# Patient Record
Sex: FEMALE | Race: WHITE | NOT HISPANIC OR LATINO | Employment: OTHER | URBAN - METROPOLITAN AREA
[De-identification: names, ages, dates, MRNs, and addresses within clinical notes are randomized per-mention and may not be internally consistent; named-entity substitution may affect disease eponyms.]

---

## 2019-08-11 PROBLEM — E07.9 THYROID EYE DISEASE: Status: ACTIVE | Noted: 2019-08-11

## 2019-08-11 PROBLEM — H25.811 COMBINED FORMS OF AGE-RELATED CATARACT OF RIGHT EYE: Status: ACTIVE | Noted: 2019-08-11

## 2019-08-11 PROBLEM — H57.89 THYROID EYE DISEASE: Status: ACTIVE | Noted: 2019-08-11

## 2019-12-12 PROBLEM — H26.8 MATURE CATARACT: Status: ACTIVE | Noted: 2019-12-12

## 2021-04-23 ENCOUNTER — PATIENT OUTREACH (OUTPATIENT)
Dept: ADMINISTRATIVE | Facility: HOSPITAL | Age: 61
End: 2021-04-23

## 2021-06-09 ENCOUNTER — PATIENT OUTREACH (OUTPATIENT)
Dept: ADMINISTRATIVE | Facility: HOSPITAL | Age: 61
End: 2021-06-09

## 2021-06-09 DIAGNOSIS — Z12.31 SCREENING MAMMOGRAM, ENCOUNTER FOR: Primary | ICD-10-CM

## 2021-07-12 ENCOUNTER — NURSE TRIAGE (OUTPATIENT)
Dept: ADMINISTRATIVE | Facility: CLINIC | Age: 61
End: 2021-07-12

## 2021-08-02 PROBLEM — E03.8 OTHER SPECIFIED HYPOTHYROIDISM: Status: ACTIVE | Noted: 2021-08-02

## 2021-08-02 PROBLEM — E11.628 TYPE 2 DIABETES MELLITUS WITH SKIN COMPLICATION, WITH LONG-TERM CURRENT USE OF INSULIN: Status: ACTIVE | Noted: 2021-08-02

## 2021-08-02 PROBLEM — Z79.4 TYPE 2 DIABETES MELLITUS WITH SKIN COMPLICATION, WITH LONG-TERM CURRENT USE OF INSULIN: Status: ACTIVE | Noted: 2021-08-02

## 2021-08-02 PROBLEM — I10 ESSENTIAL HYPERTENSION: Status: ACTIVE | Noted: 2021-08-02

## 2022-05-03 PROBLEM — F32.A DEPRESSION: Status: ACTIVE | Noted: 2022-05-03

## 2022-10-03 PROBLEM — F33.41 RECURRENT MAJOR DEPRESSIVE DISORDER, IN PARTIAL REMISSION: Status: ACTIVE | Noted: 2022-05-03

## 2022-11-15 ENCOUNTER — NURSE TRIAGE (OUTPATIENT)
Dept: ADMINISTRATIVE | Facility: CLINIC | Age: 62
End: 2022-11-15

## 2022-11-16 NOTE — TELEPHONE ENCOUNTER
Reason for Disposition   Caller is requesting health information that triager feels is unethical or illegal    Additional Information   Negative: New-onset or worsening symptoms, see that guideline (e.g., diarrhea, runny nose, sore throat)   Negative: Medicine question not related to refill or renewal   Negative: Caller (e.g., patient or pharmacist) requesting information about a new medicine   Negative: Caller requesting information unrelated to medicine   Negative: Violent behavior, or threatening to physically hurt or kill someone   Negative: [1] Caller is very confused AND [2] no other adult (e.g., friend or family member) available   Negative: Sounds like a life-threatening emergency to the triager    Protocols used: Medication Refill and Renewal Call-A-AH, Difficult Caller-A-AH

## 2022-11-16 NOTE — TELEPHONE ENCOUNTER
Pt called with wanting a medication refill for Nitroglycerin and I told her that the Rx was filled in 2/21 and it was a historical prescription and that I would need to triage for issues since it had been so long since it was filled. Pt said that she was at the pharmacy and someone was suppose to have called it in today and I see no documentation of who she spoke with last entry in the chart was to psychiatry. Pt upset that I wont call it in and told again that I would need to triage for her symptoms to get her evaluated and then can get someone to write RX. Pt declines triage saying she doesn't want to waste my time. Pt said she wants to find out who she spoke to this afternoon and I told her I have no way since not documented. Pt was having chest tightness due to the cold but wouldn't let me triage. I will route to PCP but she hasnt seen him in awhile a swell

## 2023-04-13 ENCOUNTER — SOCIAL WORK (OUTPATIENT)
Dept: ADMINISTRATIVE | Facility: OTHER | Age: 63
End: 2023-04-13

## 2023-04-13 NOTE — PROGRESS NOTES
"Sw received a consult to assist with counseling. Sw called Patient ([phone]). An automated message stated "the wireless person you are calling is not available. Please try your call again later." Angel will try calling Patient at another time.    Layne Bates LCSW    [phone]    "

## 2023-04-14 ENCOUNTER — PATIENT MESSAGE (OUTPATIENT)
Dept: ADMINISTRATIVE | Facility: OTHER | Age: 63
End: 2023-04-14

## 2023-04-14 ENCOUNTER — SOCIAL WORK (OUTPATIENT)
Dept: ADMINISTRATIVE | Facility: OTHER | Age: 63
End: 2023-04-14

## 2023-04-14 NOTE — PROGRESS NOTES
Sw received a consult to assist with counseling. Sw called and spoke to Patient ([phone]). Patient expressed interest in counseling. Per her request, Sw emailed Patient a list of counselors through the [facility]. She was encouraged to contact one to schedule an assessment.    Murtaza Sánchez, [facility]   [phone]  Angela De Leon, [facility]   [phone]  Cherelle Nathan, [facility]   [phone]  Loraine Christina, [facility], LAC    [phone]  Arnold Bro, [facility]   [phone]    Layne Bates, [facility]    [phone]

## 2023-05-02 ENCOUNTER — SOCIAL WORK (OUTPATIENT)
Dept: ADMINISTRATIVE | Facility: OTHER | Age: 63
End: 2023-05-02

## 2023-05-02 ENCOUNTER — PATIENT MESSAGE (OUTPATIENT)
Dept: ADMINISTRATIVE | Facility: OTHER | Age: 63
End: 2023-05-02

## 2023-05-02 NOTE — PROGRESS NOTES
Sw received a message to call Patient. Sw called and spoke to Patient ([phone]). She explained she is still having difficulty in finding a counselor. Patient declined assistance with a referral. Sw emailed Patient the names of more counselors through the [facility]. She was encouraged to try one of them.    Layne Bates LCSW    [phone]

## 2023-08-15 PROBLEM — R42 DIZZINESS: Status: ACTIVE | Noted: 2023-08-15

## 2023-08-15 PROBLEM — E11.65 TYPE 2 DIABETES MELLITUS WITH HYPERGLYCEMIA, WITH LONG-TERM CURRENT USE OF INSULIN: Status: ACTIVE | Noted: 2021-08-02

## 2023-08-15 PROBLEM — I25.10 CORONARY ARTERY DISEASE INVOLVING NATIVE CORONARY ARTERY OF NATIVE HEART WITHOUT ANGINA PECTORIS: Status: ACTIVE | Noted: 2023-08-15

## 2023-08-15 PROBLEM — R53.1 GENERALIZED WEAKNESS: Status: ACTIVE | Noted: 2023-08-15

## 2023-08-18 PROBLEM — R00.1 BRADYCARDIA: Status: ACTIVE | Noted: 2023-08-18

## 2023-08-18 PROBLEM — I95.9 ARTERIAL HYPOTENSION: Status: ACTIVE | Noted: 2023-08-18

## 2023-12-14 PROBLEM — F41.9 ANXIETY: Status: ACTIVE | Noted: 2023-12-14

## 2024-04-11 PROBLEM — R07.9 CHEST PAIN: Status: ACTIVE | Noted: 2024-04-11

## 2024-04-11 PROBLEM — R06.02 SOB (SHORTNESS OF BREATH): Status: ACTIVE | Noted: 2024-04-11

## 2024-04-11 PROBLEM — E11.42 TYPE 2 DIABETES MELLITUS WITH DIABETIC POLYNEUROPATHY, WITH LONG-TERM CURRENT USE OF INSULIN: Status: ACTIVE | Noted: 2021-08-02

## 2024-04-11 PROBLEM — E66.01 SEVERE OBESITY (BMI 35.0-39.9) WITH COMORBIDITY: Status: ACTIVE | Noted: 2024-04-11

## 2024-04-11 PROBLEM — I25.118 CORONARY ARTERY DISEASE OF NATIVE ARTERY OF NATIVE HEART WITH STABLE ANGINA PECTORIS: Status: ACTIVE | Noted: 2023-08-15

## 2024-04-24 PROBLEM — I25.110 CORONARY ARTERY DISEASE INVOLVING NATIVE CORONARY ARTERY OF NATIVE HEART WITH UNSTABLE ANGINA PECTORIS: Status: ACTIVE | Noted: 2023-08-15

## 2024-04-24 PROBLEM — B37.2 CANDIDAL INTERTRIGO: Status: ACTIVE | Noted: 2024-04-24

## 2024-04-24 PROBLEM — I20.0 CRESCENDO ANGINA: Status: ACTIVE | Noted: 2024-04-24

## 2024-04-29 PROBLEM — F33.1 MODERATE EPISODE OF RECURRENT MAJOR DEPRESSIVE DISORDER: Status: ACTIVE | Noted: 2022-05-03

## 2024-06-06 PROBLEM — F11.90 OPIOID USE DISORDER: Status: ACTIVE | Noted: 2024-06-06

## 2024-06-06 PROBLEM — E03.9 HYPOTHYROIDISM: Status: ACTIVE | Noted: 2021-08-02

## 2024-06-06 PROBLEM — F32.9 MAJOR DEPRESSIVE DISORDER: Status: ACTIVE | Noted: 2024-06-06

## 2024-07-08 PROBLEM — F32.9 MAJOR DEPRESSIVE DISORDER: Status: RESOLVED | Noted: 2024-06-06 | Resolved: 2024-07-08

## 2024-07-08 PROBLEM — F11.90 OPIOID USE DISORDER: Status: RESOLVED | Noted: 2024-06-06 | Resolved: 2024-07-08

## 2024-07-11 PROBLEM — R06.02 SOB (SHORTNESS OF BREATH): Status: RESOLVED | Noted: 2024-04-11 | Resolved: 2024-07-11

## 2024-07-11 PROBLEM — I95.9 ARTERIAL HYPOTENSION: Status: RESOLVED | Noted: 2023-08-18 | Resolved: 2024-07-11

## 2024-07-11 PROBLEM — R00.1 BRADYCARDIA: Status: RESOLVED | Noted: 2023-08-18 | Resolved: 2024-07-11

## 2024-07-11 PROBLEM — R07.9 CHEST PAIN ON EXERTION: Status: RESOLVED | Noted: 2024-04-11 | Resolved: 2024-07-11

## 2024-07-11 PROBLEM — R42 DIZZINESS: Status: RESOLVED | Noted: 2023-08-15 | Resolved: 2024-07-11

## 2024-07-14 PROBLEM — R53.1 GENERALIZED WEAKNESS: Status: RESOLVED | Noted: 2023-08-15 | Resolved: 2024-07-14

## 2024-07-14 PROBLEM — I20.0 CRESCENDO ANGINA: Status: RESOLVED | Noted: 2024-04-24 | Resolved: 2024-07-14

## 2024-08-14 PROBLEM — Z95.1 S/P 2-VESSEL CORONARY ARTERY BYPASS: Status: ACTIVE | Noted: 2024-08-14

## 2024-08-14 PROBLEM — R00.2 PALPITATIONS: Status: ACTIVE | Noted: 2024-08-14

## 2024-10-28 PROBLEM — E78.5 DYSLIPIDEMIA: Status: ACTIVE | Noted: 2024-10-28

## 2024-11-06 PROBLEM — F34.1 PERSISTENT DEPRESSIVE DISORDER: Status: ACTIVE | Noted: 2022-05-03

## 2024-11-06 PROBLEM — G47.00 INSOMNIA: Status: ACTIVE | Noted: 2024-11-06

## 2024-11-20 PROBLEM — F33.1 MODERATE EPISODE OF RECURRENT MAJOR DEPRESSIVE DISORDER: Status: ACTIVE | Noted: 2024-11-20

## 2024-12-06 PROBLEM — M65.341 TRIGGER RING FINGER OF RIGHT HAND: Status: ACTIVE | Noted: 2024-12-06

## 2025-01-17 PROBLEM — F33.2 MDD (MAJOR DEPRESSIVE DISORDER), RECURRENT EPISODE, SEVERE: Status: ACTIVE | Noted: 2024-11-20

## 2025-01-17 PROBLEM — F42.4 SELF-EXCORIATION DISORDER: Status: ACTIVE | Noted: 2025-01-17

## 2025-01-18 PROBLEM — R45.851 SUICIDAL IDEATIONS: Status: ACTIVE | Noted: 2025-01-18

## 2025-01-18 PROBLEM — L03.115 CELLULITIS OF RIGHT LEG: Status: ACTIVE | Noted: 2025-01-18

## 2025-04-07 PROBLEM — E11.628 TYPE 2 DIABETES MELLITUS WITH SKIN COMPLICATION, WITH LONG-TERM CURRENT USE OF INSULIN: Status: ACTIVE | Noted: 2025-04-07

## 2025-04-07 PROBLEM — Z79.4 TYPE 2 DIABETES MELLITUS WITH SKIN COMPLICATION, WITH LONG-TERM CURRENT USE OF INSULIN: Status: ACTIVE | Noted: 2025-04-07

## 2025-05-23 ENCOUNTER — PATIENT OUTREACH (OUTPATIENT)
Dept: ADMINISTRATIVE | Facility: HOSPITAL | Age: 65
End: 2025-05-23

## 2025-06-24 ENCOUNTER — PATIENT MESSAGE (OUTPATIENT)
Dept: ADMINISTRATIVE | Facility: HOSPITAL | Age: 65
End: 2025-06-24

## 2025-07-07 ENCOUNTER — PATIENT MESSAGE (OUTPATIENT)
Dept: ADMINISTRATIVE | Facility: HOSPITAL | Age: 65
End: 2025-07-07

## 2025-07-07 ENCOUNTER — PATIENT OUTREACH (OUTPATIENT)
Dept: ADMINISTRATIVE | Facility: HOSPITAL | Age: 65
End: 2025-07-07

## 2025-07-07 NOTE — PROGRESS NOTES
7-7-25 follow up on campaign msg regarding MMG, screening schedule for 9-5-25, pt portal msg sent

## 2025-08-22 ENCOUNTER — PATIENT OUTREACH (OUTPATIENT)
Dept: ADMINISTRATIVE | Facility: HOSPITAL | Age: 65
End: 2025-08-22